# Patient Record
Sex: FEMALE | Race: WHITE | NOT HISPANIC OR LATINO | Employment: OTHER | URBAN - METROPOLITAN AREA
[De-identification: names, ages, dates, MRNs, and addresses within clinical notes are randomized per-mention and may not be internally consistent; named-entity substitution may affect disease eponyms.]

---

## 2017-05-15 ENCOUNTER — APPOINTMENT (OUTPATIENT)
Dept: RADIOLOGY | Facility: MEDICAL CENTER | Age: 67
End: 2017-05-15
Attending: EMERGENCY MEDICINE
Payer: MEDICARE

## 2017-05-15 ENCOUNTER — HOSPITAL ENCOUNTER (EMERGENCY)
Facility: MEDICAL CENTER | Age: 67
End: 2017-05-16
Attending: EMERGENCY MEDICINE
Payer: MEDICARE

## 2017-05-15 DIAGNOSIS — N28.89 RENAL MASS: ICD-10-CM

## 2017-05-15 DIAGNOSIS — L03.311 CELLULITIS OF ABDOMINAL WALL: ICD-10-CM

## 2017-05-15 DIAGNOSIS — K63.2 ENTEROCUTANEOUS FISTULA: ICD-10-CM

## 2017-05-15 DIAGNOSIS — R91.1 LUNG NODULE: ICD-10-CM

## 2017-05-15 LAB
ALBUMIN SERPL BCP-MCNC: 4 G/DL (ref 3.2–4.9)
ALBUMIN/GLOB SERPL: 1 G/DL
ALP SERPL-CCNC: 63 U/L (ref 30–99)
ALT SERPL-CCNC: 9 U/L (ref 2–50)
ANION GAP SERPL CALC-SCNC: 9 MMOL/L (ref 0–11.9)
AST SERPL-CCNC: 15 U/L (ref 12–45)
BASOPHILS # BLD AUTO: 0.5 % (ref 0–1.8)
BASOPHILS # BLD: 0.04 K/UL (ref 0–0.12)
BILIRUB SERPL-MCNC: 0.3 MG/DL (ref 0.1–1.5)
BUN SERPL-MCNC: 14 MG/DL (ref 8–22)
CALCIUM SERPL-MCNC: 10.6 MG/DL (ref 8.5–10.5)
CHLORIDE SERPL-SCNC: 103 MMOL/L (ref 96–112)
CO2 SERPL-SCNC: 23 MMOL/L (ref 20–33)
CREAT SERPL-MCNC: 0.57 MG/DL (ref 0.5–1.4)
EOSINOPHIL # BLD AUTO: 0.14 K/UL (ref 0–0.51)
EOSINOPHIL NFR BLD: 1.6 % (ref 0–6.9)
ERYTHROCYTE [DISTWIDTH] IN BLOOD BY AUTOMATED COUNT: 43.2 FL (ref 35.9–50)
GFR SERPL CREATININE-BSD FRML MDRD: >60 ML/MIN/1.73 M 2
GLOBULIN SER CALC-MCNC: 4 G/DL (ref 1.9–3.5)
GLUCOSE SERPL-MCNC: 103 MG/DL (ref 65–99)
HCT VFR BLD AUTO: 39.4 % (ref 37–47)
HGB BLD-MCNC: 13 G/DL (ref 12–16)
IMM GRANULOCYTES # BLD AUTO: 0.05 K/UL (ref 0–0.11)
IMM GRANULOCYTES NFR BLD AUTO: 0.6 % (ref 0–0.9)
LACTATE BLD-SCNC: 1.8 MMOL/L (ref 0.5–2)
LYMPHOCYTES # BLD AUTO: 2.12 K/UL (ref 1–4.8)
LYMPHOCYTES NFR BLD: 24.7 % (ref 22–41)
MCH RBC QN AUTO: 26.5 PG (ref 27–33)
MCHC RBC AUTO-ENTMCNC: 33 G/DL (ref 33.6–35)
MCV RBC AUTO: 80.4 FL (ref 81.4–97.8)
MONOCYTES # BLD AUTO: 0.62 K/UL (ref 0–0.85)
MONOCYTES NFR BLD AUTO: 7.2 % (ref 0–13.4)
NEUTROPHILS # BLD AUTO: 5.62 K/UL (ref 2–7.15)
NEUTROPHILS NFR BLD: 65.4 % (ref 44–72)
NRBC # BLD AUTO: 0 K/UL
NRBC BLD AUTO-RTO: 0 /100 WBC
PLATELET # BLD AUTO: 337 K/UL (ref 164–446)
PMV BLD AUTO: 10 FL (ref 9–12.9)
POTASSIUM SERPL-SCNC: 4 MMOL/L (ref 3.6–5.5)
PROT SERPL-MCNC: 8 G/DL (ref 6–8.2)
RBC # BLD AUTO: 4.9 M/UL (ref 4.2–5.4)
SODIUM SERPL-SCNC: 135 MMOL/L (ref 135–145)
WBC # BLD AUTO: 8.6 K/UL (ref 4.8–10.8)

## 2017-05-15 PROCEDURE — 85025 COMPLETE CBC W/AUTO DIFF WBC: CPT

## 2017-05-15 PROCEDURE — 87205 SMEAR GRAM STAIN: CPT

## 2017-05-15 PROCEDURE — 96360 HYDRATION IV INFUSION INIT: CPT

## 2017-05-15 PROCEDURE — 83605 ASSAY OF LACTIC ACID: CPT

## 2017-05-15 PROCEDURE — 700105 HCHG RX REV CODE 258: Performed by: EMERGENCY MEDICINE

## 2017-05-15 PROCEDURE — 80053 COMPREHEN METABOLIC PANEL: CPT

## 2017-05-15 PROCEDURE — 36415 COLL VENOUS BLD VENIPUNCTURE: CPT

## 2017-05-15 PROCEDURE — 87070 CULTURE OTHR SPECIMN AEROBIC: CPT

## 2017-05-15 PROCEDURE — 99284 EMERGENCY DEPT VISIT MOD MDM: CPT

## 2017-05-15 RX ORDER — SODIUM CHLORIDE 9 MG/ML
1000 INJECTION, SOLUTION INTRAVENOUS ONCE
Status: COMPLETED | OUTPATIENT
Start: 2017-05-15 | End: 2017-05-15

## 2017-05-15 RX ADMIN — SODIUM CHLORIDE 1000 ML: 9 INJECTION, SOLUTION INTRAVENOUS at 22:22

## 2017-05-15 ASSESSMENT — ENCOUNTER SYMPTOMS
CHILLS: 0
FEVER: 0
NAUSEA: 0
VOMITING: 0

## 2017-05-15 ASSESSMENT — LIFESTYLE VARIABLES: DO YOU DRINK ALCOHOL: NO

## 2017-05-15 ASSESSMENT — PAIN SCALES - GENERAL: PAINLEVEL_OUTOF10: 1

## 2017-05-15 NOTE — ED AVS SNAPSHOT
Avitide Access Code: [email or web address]  Expires: 6/15/2017  2:34 AM    Your email address is not on file at [facility].  Email Addresses are required for you to sign up for Avitide, please contact [phone] to verify your personal information and to provide your email address prior to attempting to register for Avitide.    Monica Nagel  [address] [place] [address]    Avitide  A secure, online tool to manage your health information     [facility]’s Avitide® is a secure, online tool that connects you to your personalized health information from the privacy of your home -- day or night - making it very easy for you to manage your healthcare. Once the activation process is completed, you can even access your medical information using the Avitide greg, which is available for free in the Apple Greg store or Google Play store.     To learn more about Avitide, visit [email or web address]    There are two levels of access available (as shown below):   My Chart Features  [facility] Primary Care Doctor [facility]  Specialists [facility]  Urgent  Care [facility] Primary Care Doctor   Email your healthcare team securely and privately 24/7 X X X    Manage appointments: schedule your next appointment; view details of past/upcoming appointments X      Request prescription refills. X      View recent personal medical records, including lab and immunizations X X X X   View health record, including health history, allergies, medications X X X X   Read reports about your outpatient visits, procedures, consult and ER notes X X X X   See your discharge summary, which is a recap of your hospital and/or ER visit that includes your diagnosis, lab results, and care plan X X  X     How to register for Avitide:  Once your e-mail address has been verified, follow the following steps to sign up for Avitide.     1. Go to  [email or web address]  2. Click on the Sign Up Now box, which takes you to the New Member Sign Up page. You will  need to provide the following information:  a. Enter your Zattikka Access Code exactly as it appears at the top of this page. (You will not need to use this code after you’ve completed the sign-up process. If you do not sign up before the expiration date, you must request a new code.)   b. Enter your date of birth.   c. Enter your home email address.   d. Click Submit, and follow the next screen’s instructions.  3. Create a PartyLinet ID. This will be your Zattikka login ID and cannot be changed, so think of one that is secure and easy to remember.  4. Create a Zattikka password. You can change your password at any time.  5. Enter your Password Reset Question and Answer. This can be used at a later time if you forget your password.   6. Enter your e-mail address. This allows you to receive e-mail notifications when new information is available in Zattikka.  7. Click Sign Up. You can now view your health information.    For assistance activating your Zattikka account, call [phone]

## 2017-05-15 NOTE — ED AVS SNAPSHOT
Home Care Instructions                                                                                                                Monica Nagel   MRN: [id number]    Department:  [facility], Emergency Dept   Date of Visit:  5/15/2017            [facility], Emergency Dept    [address]    [place] [address]    Phone:  [phone]      You were seen by     Cricket Hopkins M.D.      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 Follow up with Your Physician. Schedule an appointment as soon as possible for a visit in 1 week.    Specialty:  Emergency Medicine    Why:  See your surgeon and your doctor for referal to urology. You also need a chest CT in 3 months.    Contact information    Varies          2. Follow up with Gabo Irby M.D.. Schedule an appointment as soon as possible for a visit in 4 days.    Specialties:  Surgery, Radiology    Why:  If you want to see a surgeon here call tomorrow for an appointment on Friday.     Contact information    [address]  [place] [address] [phone]        Medication Information     Review all of your home medications and newly ordered medications with your primary doctor and/or pharmacist as soon as possible. Follow medication instructions as directed by your doctor and/or pharmacist.     Please keep your complete medication list with you and share with your physician. Update the information when medications are discontinued, doses are changed, or new medications (including over-the-counter products) are  added; and carry medication information at all times in the event of emergency situations.               Medication List      START taking these medications        Instructions    Morning Afternoon Evening Bedtime    cefdinir 300 MG Caps   Commonly known as:  OMNICEF        Take 1 Cap by mouth 2 times a day for 10 days.   Dose:  300 mg                        doxycycline 100 MG Tabs   Commonly known as:  VIBRAMYCIN        Take 1 Tab by mouth 2 times a day for 10 days.   Dose:  100 mg                        metronidazole 500 MG Tabs   Commonly known as:  FLAGYL        Take 1 Tab by mouth 3 times a day.   Dose:  500 mg                             Where to Get Your Medications      You can get these medications from any pharmacy     Bring a paper prescription for each of these medications    - cefdinir 300 MG Caps  - doxycycline 100 MG Tabs  - metronidazole 500 MG Tabs            Procedures and tests performed during your visit     BLOOD CULTURE,HOLD    CBC WITH DIFFERENTIAL    COMP METABOLIC PANEL    CONSENT FOR CONTRAST INJECTION    CT-ABDOMEN-PELVIS WITH    CULTURE WOUND W/ GRAM STAIN    ESTIMATED GFR    IV Saline Lock    LACTIC ACID        Discharge Instructions       Return if you have increasing pain, redness, fever, or significant pus.   Take all your antibiotics until gone.  You had a kidney mass found that needs to be evaluated by a urologist when you get home.       Cellulitis  Cellulitis is an infection of the skin and the tissue under the skin. The infected area is usually red and tender. This happens most often in the arms and lower legs.  HOME CARE   · Take your antibiotic medicine as told. Finish the medicine even if you start to feel better.  · Keep the infected arm or leg raised (elevated).  · Put a warm cloth on the area up to 4 times per day.  · Only take medicines as told by your doctor.  · Keep all doctor visits as told.  GET HELP IF:  · You see red streaks on the skin coming from the infected  area.  · Your red area gets bigger or turns a dark color.  · Your bone or joint under the infected area is painful after the skin heals.  · Your infection comes back in the same area or different area.  · You have a puffy (swollen) bump in the infected area.  · You have new symptoms.  · You have a fever.  GET HELP RIGHT AWAY IF:   · You feel very sleepy.  · You throw up (vomit) or have watery poop (diarrhea).  · You feel sick and have muscle aches and pains.  MAKE SURE YOU:   · Understand these instructions.  · Will watch your condition.  · Will get help right away if you are not doing well or get worse.     This information is not intended to replace advice given to you by your health care provider. Make sure you discuss any questions you have with your health care provider.     Document Released: 06/05/2009 Document Revised: 01/08/2016 Document Reviewed: 03/04/2013  Perf[employer]ix Interactive Patient Education ©2016 [employer]    Incidental Abnormal Radiological Finding  An incidental abnormal radiologic finding is a very small mass or scar tissue, detected anywhere in the body, unrelated to the reason for your visit. With newer imaging tests and technologies, it is becoming more common to detect small masses and tissue abnormalities. It is important for you to work with your caregiver because it can sometimes be related to undiagnosed illness or other symptoms. Most often however, the finding is not causing symptoms and is not a cause for concern.  TYPES OF FINDINGS  Abnormal radiologic findings are often located in the kidneys or lungs, but they can also be found in the heart, liver, breasts, brain, gallbladder, uterus and other surrounding organs and tissues.   There are many types of masses and tissue abnormalities that can be detected during an imaging test. These may include:   · Lesions  changes in tissue due to infection, tissue death, or trauma.  · Cysts a sac filled with fluid, crystals, or some other  "substance.  · Tumors non-cancerous or cancerous solid formation.  You may hear medical terms, such as, \"pulmonary nodule\" (a small mass in the lung) or \"renal mass\" (mass in the kidney). Ask your caregiver if these terms apply to your findings.   DO I NEED FURTHER DIAGNOSIS?  There are many possible causes of incidental radiologic findings. Your caregiver will determine whether it requires additional screening tests, diagnostic tests, treatments, or referral to a surgeon. Generally, very small tissue changes or masses will not require any follow up testing. Much research has been done in this area. These very small abnormalities are considered low risk of becoming a problem in the future.   Depending on the size and appearance of the finding, your caregiver may recommend additional testing. Additional testing may also be recommended if you have certain risk factors or medical conditions that increase your risk of related problems. It is a good idea to have additional testing if you have other symptoms or concerns. Sometimes, these early findings can give you a chance at early treatment and avoid problems in the future.  TESTING AND DIAGNOSIS  Tests and exams may be a one-time screening or periodic follow-up. Periodic follow-up will help your caregiver determine whether the abnormality is growing and becoming a concern. Tests may include:   · Physical examination.  · Blood tests.  · Urine tests.  · Imaging tests, such as abdominal ultrasound, CT scan, or MRI.  · Biopsy.  TREATMENT  Treatment varies, depending on the cause, location, size and appearance of the finding. Treatment will also depend on your age and underlying conditions or symptoms. Sometimes treatment is not necessary at all. However, treatment may include:  · Watchful waiting with periodic examination and testing.  · Treatments to reduce the size of the abnormality.  · Surgical or biopsy removal of the abnormality.  · Additional treatments to address " any underlying conditions.  HOME CARE INSTRUCTIONS   · See your caregiver for follow up examination and testing as directed. It is important that you schedule appointments as directed.  · Keep calm. Your caregiver will let you know if this is a routine follow up, or if there is a reason for concern. Remember, early detection can be very beneficial to you.  · Follow all of your caregiver's aftercare instructions related to the reason for your visit.    Please follow up with a primary physician for blood pressure management, diabetic screening, and all other preventive health concerns.    Document Released: 04/03/2012 Document Revised: 03/11/2013 Document Reviewed: 04/03/2012  ExitCare® Patient Information ©2014 [employer].                  Patient Information     Patient Information    Following emergency treatment: all patient requiring follow-up care must return either to a private physician or a clinic if your condition worsens before you are able to obtain further medical attention, please return to the emergency room.     Billing Information    At [facility], we work to make the billing process streamlined for our patients.  Our Representatives are here to answer any questions you may have regarding your hospital bill.  If you have insurance coverage and have supplied your insurance information to us, we will submit a claim to your insurer on your behalf.  Should you have any questions regarding your bill, we can be reached online or by phone as follows:  Online: You are able pay your bills online or live chat with our representatives about any billing questions you may have. We are here to help Monday - Friday from 8:00am to 7:30pm and 9:00am - 12:00pm on Saturdays.  Please visit [email or web address]  for more information.   Phone:  [phone] or [phone]    Please note that your emergency physician, surgeon, pathologist, radiologist, anesthesiologist, and other  specialists are not employed by [facility] and will therefore bill separately for their services.  Please contact them directly for any questions concerning their bills at the numbers below:     Emergency Physician Services:  [phone]  [facility]:  [phone]  [facility]:  [phone]  [facility]:  [phone]    1. Your final bill may vary from the amount quoted upon discharge if all procedures are not complete at that time, or if your doctor has additional procedures of which we are not aware. You will receive an additional bill if you return to the Emergency Department at [facility] for suture removal regardless of the facility of which the sutures were placed.     2. Please arrange for settlement of this account at the emergency registration.    3. All self-pay accounts are due in full at the time of treatment.  If you are unable to meet this obligation then payment is expected within 4-5 days.     4. If you have had radiology studies (CT, X-ray, Ultrasound, MRI), you have received a preliminary result during your emergency department visit. Please contact the radiology department [phone] to receive a copy of your final result. Please discuss the Final result with your primary physician or with the follow up physician provided.     Crisis Hotline:  [place] Crisis Hotline:  2-665-NNNCCYI or 1-105.601.7996  [place] Crisis Hotline:    1-309.771.2004 or 490-585-8432         ED Discharge Follow Up Questions    1. In order to provide you with very good care, we would like to follow up with a phone call in the next few days.  May we have your permission to contact you?     YES /  NO    2. What is the best phone number to call you? (       )_____-__________    3. What is the best time to call you?      Morning  /  Afternoon  /  Evening                   Patient Signature:  ____________________________________________________________    Date:   ____________________________________________________________

## 2017-05-15 NOTE — ED AVS SNAPSHOT
5/16/2017    Monica Nagel  [address] [facility] [address]    Dear Monica:    [facility] wants to ensure your discharge home is safe and you or your loved ones have had all of your questions answered regarding your care after you leave the hospital.    Below is a list of resources and contact information should you have any questions regarding your hospital stay, follow-up instructions, or active medical symptoms.    Questions or Concerns Regarding… Contact   Medical Questions Related to Your Discharge  (7 days a week, 8am-5pm) Contact a Nurse Care Coordinator   [phone]   Medical Questions Not Related to Your Discharge  (24 hours a day / 7 days a week)  Contact the Nurse Health Line   [phone]    Medications or Discharge Instructions Refer to your discharge packet   or contact your [facility] Primary Care Provider   [phone]   Follow-up Appointment(s) Schedule your appointment via Photoways   or contact Scheduling [phone]   Billing Review your statement via Photoways  or contact Billing [phone]   Medical Records Review your records via Photoways   or contact Medical Records [phone]     You may receive a telephone call within two days of discharge. This call is to make certain you understand your discharge instructions and have the opportunity to have any questions answered. You can also easily access your medical information, test results and upcoming appointments via the Photoways free online health management tool. You can learn more and sign up at [email or web address]/[email or web address]. For assistance setting up your Photoways account, please call [phone].    Once again, we want to ensure your discharge home is safe and that you have a clear understanding of any next steps in your care. If you have any questions or concerns, please do not hesitate to contact us, we are here for you. Thank you for choosing [facility] for your healthcare needs.    Sincerely,    Your [facility] Healthcare Team

## 2017-05-16 VITALS
HEIGHT: 59 IN | RESPIRATION RATE: 14 BRPM | WEIGHT: 174.6 LBS | SYSTOLIC BLOOD PRESSURE: 124 MMHG | BODY MASS INDEX: 35.2 KG/M2 | OXYGEN SATURATION: 96 % | HEART RATE: 86 BPM | TEMPERATURE: 98.3 F | DIASTOLIC BLOOD PRESSURE: 81 MMHG

## 2017-05-16 LAB
BLOOD CULTURE HOLD CXBCH: NORMAL
GRAM STN SPEC: NORMAL
SIGNIFICANT IND 70042: NORMAL
SITE SITE: NORMAL
SOURCE SOURCE: NORMAL

## 2017-05-16 PROCEDURE — 74177 CT ABD & PELVIS W/CONTRAST: CPT

## 2017-05-16 PROCEDURE — 700117 HCHG RX CONTRAST REV CODE 255: Performed by: EMERGENCY MEDICINE

## 2017-05-16 RX ORDER — METRONIDAZOLE 500 MG/1
500 TABLET ORAL 3 TIMES DAILY
Qty: 30 TAB | Refills: 0 | Status: SHIPPED | OUTPATIENT
Start: 2017-05-16

## 2017-05-16 RX ORDER — DOXYCYCLINE HYCLATE 100 MG
100 TABLET ORAL 2 TIMES DAILY
Qty: 20 TAB | Refills: 0 | Status: SHIPPED | OUTPATIENT
Start: 2017-05-16 | End: 2017-05-26

## 2017-05-16 RX ORDER — CEFDINIR 300 MG/1
300 CAPSULE ORAL 2 TIMES DAILY
Qty: 20 CAP | Refills: 0 | Status: SHIPPED | OUTPATIENT
Start: 2017-05-16 | End: 2017-05-26

## 2017-05-16 RX ADMIN — IOHEXOL 100 ML: 350 INJECTION, SOLUTION INTRAVENOUS at 00:12

## 2017-05-16 RX ADMIN — IOHEXOL 50 ML: 240 INJECTION, SOLUTION INTRATHECAL; INTRAVASCULAR; INTRAVENOUS; ORAL at 00:12

## 2017-05-16 NOTE — ED PROVIDER NOTES
ED Provider Note    Scribed for Cricket Hopkins M.D. by Phuc Rao. 5/15/2017, 9:56 PM.    Primary care provider: None  Means of arrival: walk-in  History obtained from: patient  History limited by: none    CHIEF COMPLAINT  Chief Complaint   Patient presents with   • Wound Check   • Post-Op Complications       HPI  Monica Nagel is a 67 y.o. female who presents to the Emergency Department for evaluation of open wound status post abdominal surgery. Per patient, she had surgery in [facility] in [place] for an abscess on her bladder and diverticulitis. Patient states her wound opened up two weeks after her surgery. She has been going to a [facility] in [place]. The upper part of her wound healed, but the lower portion of her wound is still open. Her wound has been draining, but she confirms the wound has been draining since her surgery. In the last 24 hours, her wound has developed associated surrounding erythema and tenderness. Patient states she changes her packing and dressing two times a day, and she was last changed this afternoon. She is in town for vacation. The patient states she called her doctor in [place] today, and he prompted her to come to the ED. She reports that her wound opened once before, and a flax seed came out, in which her doctor in [place] suspects she has a small opening in her small intestine. The patient denies fever, chills, nausea, vomiting. She also denies any stool coming out of her wound. She also denies a history of Crohn's disease.     REVIEW OF SYSTEMS  Review of Systems   Constitutional: Negative for fever and chills.   Gastrointestinal: Negative for nausea and vomiting.        Positive abdominal surgical wound  Positive drainage from wound  Positive erythema around wound  Positive tenderness around wound  Negative stool coming out of wound   All other systems reviewed and are negative.    C.    PAST MEDICAL HISTORY   has a past medical history of  "Diverticulitis.    SURGICAL HISTORY   has past surgical history that includes other and gyn surgery.    SOCIAL HISTORY  Social History   Substance Use Topics   • Smoking status: Never Smoker    • Smokeless tobacco: None   • Alcohol Use: No      History   Drug Use No       FAMILY HISTORY  No history pertinent to complaint.     CURRENT MEDICATIONS  Home Medications     Reviewed by Tika Henao R.N. (Registered Nurse) on 05/15/17 at 2225  Med List Status: Complete    Medication Last Dose Status          Patient Lambert Taking any Medications                        ALLERGIES  Allergies   Allergen Reactions   • Amoxicillin Vomiting   • Codeine Vomiting   • Sulfa Drugs Vomiting   • Tape Rash     Paper tape is ok for short period       PHYSICAL EXAM  VITAL SIGNS: /69 mmHg  Pulse 116  Temp(Src) 36.8 °C (98.3 °F)  Resp 16  Ht 1.499 m (4' 11\")  Wt 79.2 kg (174 lb 9.7 oz)  BMI 35.25 kg/m2  SpO2 97%    Constitutional: Well developed, Well nourished, No distress.   HENT: Normocephalic, Atraumatic  Eyes: Conjunctiva normal, No discharge.   Cardiovascular: Normal heart rate, Normal rhythm, No murmurs, equal pulses.   Pulmonary: Normal breath sounds, No respiratory distress, No wheezing, No rales, No rhonchi.  Chest: No chest wall tenderness or deformity.   Abdomen: Soft, no rebound, no guarding. Non tender except for a partially healed infraumbilical incision. There are two openings with packing. Inferior opening has purulent drainage, in between a 4 x 5 cm induration with erythema and warmth with fluctuance in the middle. Slight erythema extending around the area.  Musculoskeletal: No major deformities noted, No tenderness.   Neurologic: Alert & oriented x 3, Normal motor function,  No focal deficits noted.   Psychiatric: Affect normal, Judgment normal, Mood normal.     LABS  Results for orders placed or performed during the hospital encounter of 05/15/17   CBC WITH DIFFERENTIAL   Result Value Ref Range    WBC " 8.6 4.8 - 10.8 K/uL    RBC 4.90 4.20 - 5.40 M/uL    Hemoglobin 13.0 12.0 - 16.0 g/dL    Hematocrit 39.4 37.0 - 47.0 %    MCV 80.4 (L) 81.4 - 97.8 fL    MCH 26.5 (L) 27.0 - 33.0 pg    MCHC 33.0 (L) 33.6 - 35.0 g/dL    RDW 43.2 35.9 - 50.0 fL    Platelet Count 337 164 - 446 K/uL    MPV 10.0 9.0 - 12.9 fL    Neutrophils-Polys 65.40 44.00 - 72.00 %    Lymphocytes 24.70 22.00 - 41.00 %    Monocytes 7.20 0.00 - 13.40 %    Eosinophils 1.60 0.00 - 6.90 %    Basophils 0.50 0.00 - 1.80 %    Immature Granulocytes 0.60 0.00 - 0.90 %    Nucleated RBC 0.00 /100 WBC    Neutrophils (Absolute) 5.62 2.00 - 7.15 K/uL    Lymphs (Absolute) 2.12 1.00 - 4.80 K/uL    Monos (Absolute) 0.62 0.00 - 0.85 K/uL    Eos (Absolute) 0.14 0.00 - 0.51 K/uL    Baso (Absolute) 0.04 0.00 - 0.12 K/uL    Immature Granulocytes (abs) 0.05 0.00 - 0.11 K/uL    NRBC (Absolute) 0.00 K/uL   COMP METABOLIC PANEL   Result Value Ref Range    Sodium 135 135 - 145 mmol/L    Potassium 4.0 3.6 - 5.5 mmol/L    Chloride 103 96 - 112 mmol/L    Co2 23 20 - 33 mmol/L    Anion Gap 9.0 0.0 - 11.9    Glucose 103 (H) 65 - 99 mg/dL    Bun 14 8 - 22 mg/dL    Creatinine 0.57 0.50 - 1.40 mg/dL    Calcium 10.6 (H) 8.5 - 10.5 mg/dL    AST(SGOT) 15 12 - 45 U/L    ALT(SGPT) 9 2 - 50 U/L    Alkaline Phosphatase 63 30 - 99 U/L    Total Bilirubin 0.3 0.1 - 1.5 mg/dL    Albumin 4.0 3.2 - 4.9 g/dL    Total Protein 8.0 6.0 - 8.2 g/dL    Globulin 4.0 (H) 1.9 - 3.5 g/dL    A-G Ratio 1.0 g/dL   LACTIC ACID   Result Value Ref Range    Lactic Acid 1.8 0.5 - 2.0 mmol/L   ESTIMATED GFR   Result Value Ref Range    GFR If African American >60 >60 mL/min/1.73 m 2    GFR If Non African American >60 >60 mL/min/1.73 m 2   BLOOD CULTURE,HOLD   Result Value Ref Range    Blood Culture Hold Collected       All labs reviewed by me.    RADIOLOGY  CT-ABDOMEN-PELVIS WITH   Final Result         1.  Left lower pole renal mass, appearance most compatible with renal cell carcinoma. Should be considered neoplastic  unless proven otherwise.   2.  Thickening of the anterolateral left bladder wall with intermediate density track between the anterior lateral bladder wall and the anterior abdominal wall, appearance suggests possible fistula. Density in the left pelvis compatible with inflammatory    process and/or phlegmon.   3.  5.1 mm right lung base pulmonary nodule, follow-up CT chest in 3 months for evaluation of stability.   4.  Hepatomegaly   5.  Diverticulosis   6.  Scoliosis      These findings were discussed with the patient's clinician, Cricket Hopkins, on 5/16/2017 12:34 AM.        The radiologist's interpretation of all radiological studies have been reviewed by me.    COURSE & MEDICAL DECISION MAKING  Pertinent Labs & Imaging studies reviewed. (See chart for details)    9:56 PM - Patient seen and examined at bedside. Patient will be treated with 1000 mL NS Infusion. Ordered abdominal/pelvic CT, culture wound w/gram stain, CBC with differential, CMP, Lactic acid to evaluate her symptoms. The differential diagnoses include but are not limited to: abdominal abscess, enteric fistula. Patient is receiving IV fluids to protect her kidneys. I will also consult general surgery. I discussed the treatment plan as above with the patient. She understood and verbalized agreement.     12:32 AM - I discussed the patient's case and the above findings with Radiology who informed me there is a possible abscess, fistula, and renal cell carcinoma.     1:42 AM - I reevaluated the patient at bedside. She is resting comfortably. I updated the patient on her lab and radiology results. I informed the patient that the mass on her kidney is possibly cancerous, and she should be evaluated hen she returns to [place]. I will consult General surgery. Patient understood and verbalized agreement.     1:53 AM - I discussed the patient's case and the above findings with Dr. Irby (General Surgery) who recommends that the patient is treated as  conservatively as possible. He is willing to see the patient in 5 days if she is still in town. He advises the patient can be given antibiotics and follow-up with her physician in [place].    2:11 AM - I reevaluated the patient at bedside. I updated her on Dr. Irby's recommendations. I will send the patient home with Doxycyline, Omnicef, and Flagyl. I informed the patient that she is welcome to return to the ED with new or worsening symptoms. Patient understood and verbalized agreement.       Medical Decision Making: Patient presents with chronic abdominal postop draining wound. At this point time I think there is a little bit of cellulitis. This is been going on for a month therefore do not think the patient needs emergent surgery she does not fever or white count or pain. Patient was made aware of the possibility that she has an enterocutaneous fistula as well as her renal cell carcinoma and lung nodules. Patient understands she needs follow-up with urology as well as her surgeon as soon as she reaches [place].    The patient will return for new or worsening symptoms and is stable at the time of discharge.    The patient is referred to a primary physician for blood pressure management, diabetic screening, and for all other preventative health concerns.    DISPOSITION:  Patient will be discharged home in stable condition.    FOLLOW UP:  Your Physician  Varies    Schedule an appointment as soon as possible for a visit in 1 week  See your surgeon and your doctor for referal to urology. You also need a chest CT in 3 months.    Gabo Irby M.D.  [address]  [address]  [facility] [address]  [phone]    Schedule an appointment as soon as possible for a visit in 4 days  If you want to see a surgeon here call tomorrow for an appointment on Friday.       OUTPATIENT MEDICATIONS:  Discharge Medication List as of 5/16/2017  2:34 AM      START taking these medications    Details   cefdinir (OMNICEF) 300 MG Cap Take  1 Cap by mouth 2 times a day for 10 days., Disp-20 Cap, R-0, Print Rx Paper      metronidazole (FLAGYL) 500 MG Tab Take 1 Tab by mouth 3 times a day., Disp-30 Tab, R-0, Print Rx Paper      doxycycline (VIBRAMYCIN) 100 MG Tab Take 1 Tab by mouth 2 times a day for 10 days., Disp-20 Tab, R-0, Print Rx Paper               FINAL IMPRESSION  1. Enterocutaneous fistula    2. Cellulitis of abdominal wall    3. Renal mass    4. Lung nodule          Phuc NUNES (Scribe), am scribing for, and in the presence of, Cricket Hopkins M.D.    Electronically signed by: Phuc Rao (Scribe), 5/15/2017    ICricket M.D. personally performed the services described in this documentation, as scribed by Phuc Rao in my presence, and it is both accurate and complete.    The note accurately reflects work and decisions made by me.  Cricket Hopkins  5/16/2017  4:45 AM

## 2017-05-16 NOTE — ED NOTES
68 y/o female ambulatory to triage requesting a wound check. Pt states she had surgery on her abd in February and has had 2 wounds that are not healing, pt sent a picture of the wound to her surgeon who states he would like her to have it checked out.

## 2017-05-16 NOTE — DISCHARGE INSTRUCTIONS
Return if you have increasing pain, redness, fever, or significant pus.   Take all your antibiotics until gone.  You had a kidney mass found that needs to be evaluated by a urologist when you get home.       Cellulitis  Cellulitis is an infection of the skin and the tissue under the skin. The infected area is usually red and tender. This happens most often in the arms and lower legs.  HOME CARE   · Take your antibiotic medicine as told. Finish the medicine even if you start to feel better.  · Keep the infected arm or leg raised (elevated).  · Put a warm cloth on the area up to 4 times per day.  · Only take medicines as told by your doctor.  · Keep all doctor visits as told.  GET HELP IF:  · You see red streaks on the skin coming from the infected area.  · Your red area gets bigger or turns a dark color.  · Your bone or joint under the infected area is painful after the skin heals.  · Your infection comes back in the same area or different area.  · You have a puffy (swollen) bump in the infected area.  · You have new symptoms.  · You have a fever.  GET HELP RIGHT AWAY IF:   · You feel very sleepy.  · You throw up (vomit) or have watery poop (diarrhea).  · You feel sick and have muscle aches and pains.  MAKE SURE YOU:   · Understand these instructions.  · Will watch your condition.  · Will get help right away if you are not doing well or get worse.     This information is not intended to replace advice given to you by your health care provider. Make sure you discuss any questions you have with your health care provider.     Document Released: 06/05/2009 Document Revised: 01/08/2016 Document Reviewed: 03/04/2013  Elsevier Interactive Patient Education ©2016 [employer]    Incidental Abnormal Radiological Finding  An incidental abnormal radiologic finding is a very small mass or scar tissue, detected anywhere in the body, unrelated to the reason for your visit. With newer imaging tests and technologies, it is becoming  "more common to detect small masses and tissue abnormalities. It is important for you to work with your caregiver because it can sometimes be related to undiagnosed illness or other symptoms. Most often however, the finding is not causing symptoms and is not a cause for concern.  TYPES OF FINDINGS  Abnormal radiologic findings are often located in the kidneys or lungs, but they can also be found in the heart, liver, breasts, brain, gallbladder, uterus and other surrounding organs and tissues.   There are many types of masses and tissue abnormalities that can be detected during an imaging test. These may include:   · Lesions  changes in tissue due to infection, tissue death, or trauma.  · Cysts a sac filled with fluid, crystals, or some other substance.  · Tumors non-cancerous or cancerous solid formation.  You may hear medical terms, such as, \"pulmonary nodule\" (a small mass in the lung) or \"renal mass\" (mass in the kidney). Ask your caregiver if these terms apply to your findings.   DO I NEED FURTHER DIAGNOSIS?  There are many possible causes of incidental radiologic findings. Your caregiver will determine whether it requires additional screening tests, diagnostic tests, treatments, or referral to a surgeon. Generally, very small tissue changes or masses will not require any follow up testing. Much research has been done in this area. These very small abnormalities are considered low risk of becoming a problem in the future.   Depending on the size and appearance of the finding, your caregiver may recommend additional testing. Additional testing may also be recommended if you have certain risk factors or medical conditions that increase your risk of related problems. It is a good idea to have additional testing if you have other symptoms or concerns. Sometimes, these early findings can give you a chance at early treatment and avoid problems in the future.  TESTING AND DIAGNOSIS  Tests and exams may be a one-time " screening or periodic follow-up. Periodic follow-up will help your caregiver determine whether the abnormality is growing and becoming a concern. Tests may include:   · Physical examination.  · Blood tests.  · Urine tests.  · Imaging tests, such as abdominal ultrasound, CT scan, or MRI.  · Biopsy.  TREATMENT  Treatment varies, depending on the cause, location, size and appearance of the finding. Treatment will also depend on your age and underlying conditions or symptoms. Sometimes treatment is not necessary at all. However, treatment may include:  · Watchful waiting with periodic examination and testing.  · Treatments to reduce the size of the abnormality.  · Surgical or biopsy removal of the abnormality.  · Additional treatments to address any underlying conditions.  HOME CARE INSTRUCTIONS   · See your caregiver for follow up examination and testing as directed. It is important that you schedule appointments as directed.  · Keep calm. Your caregiver will let you know if this is a routine follow up, or if there is a reason for concern. Remember, early detection can be very beneficial to you.  · Follow all of your caregiver's aftercare instructions related to the reason for your visit.    Please follow up with a primary physician for blood pressure management, diabetic screening, and all other preventive health concerns.    Document Released: 04/03/2012 Document Revised: 03/11/2013 Document Reviewed: 04/03/2012  [employer]® Patient Information ©2014 [employer].

## 2017-05-16 NOTE — ED NOTES
Patient updated that she is awaiting CT and lab results and given two warm blankets and a pillow for comfort.

## 2017-05-18 LAB
BACTERIA WND AEROBE CULT: ABNORMAL
GRAM STN SPEC: ABNORMAL
SIGNIFICANT IND 70042: ABNORMAL
SITE SITE: ABNORMAL
SOURCE SOURCE: ABNORMAL

## 2017-05-20 NOTE — ED NOTES
ED Positive Culture Follow-up/Notification Note:    Date: 5/20/17     Patient seen in the ED on 5/15/2017 for evaluation of open wound s/p abdominal surgery.   1. Enterocutaneous fistula    2. Cellulitis of abdominal wall    3. Renal mass    4. Lung nodule       Discharge Medication List as of 5/16/2017  2:34 AM      START taking these medications    Details   cefdinir (OMNICEF) 300 MG Cap Take 1 Cap by mouth 2 times a day for 10 days., Disp-20 Cap, R-0, Print Rx Paper      metronidazole (FLAGYL) 500 MG Tab Take 1 Tab by mouth 3 times a day., Disp-30 Tab, R-0, Print Rx Paper      doxycycline (VIBRAMYCIN) 100 MG Tab Take 1 Tab by mouth 2 times a day for 10 days., Disp-20 Tab, R-0, Print Rx Paper             Allergies: Amoxicillin; Codeine; Sulfa drugs; and Tape     Final cultures:   Results     Procedure Component Value Units Date/Time    CULTURE WOUND W/ GRAM STAIN [[id number]]  (Abnormal) Collected:  05/15/17 2226    Order Status:  Completed Specimen Information:  Wound from Abscess Updated:  05/18/17 1535     Gram Stain Result --      Result:        Many WBCs.  Moderate Gram positive cocci.       Significant Indicator POS (POS)      Source WND      Site Abdominal Abscess      Culture Result Wound -- (A)      Result:        Growth noted after further incubation,see below for  organism identification.       Culture Result Wound -- (A)      Result:        Viridans Streptococcus  Light growth       Culture Result Wound -- (A)      Result:        Streptococcus intermedius/milleri  Light growth      GRAM STAIN [[id number]] Collected:  05/15/17 2226    Order Status:  Completed Specimen Information:  Wound Updated:  05/16/17 0738     Significant Indicator .      Source WND      Site Abdominal Abscess      Gram Stain Result --      Result:        Many WBCs.  Moderate Gram positive cocci.      BLOOD CULTURE,HOLD [[id number]] Collected:  05/15/17 2224    Order Status:  Completed Updated:  05/16/17 0004     Blood Culture Hold  Collected           Plan:   Appropriate antibiotic therapy prescribed. No changes required based upon culture result.  Patient will f/u back in [place] with surgeon.    Laila Suarez
